# Patient Record
Sex: MALE | Race: WHITE | NOT HISPANIC OR LATINO | Employment: FULL TIME | URBAN - METROPOLITAN AREA
[De-identification: names, ages, dates, MRNs, and addresses within clinical notes are randomized per-mention and may not be internally consistent; named-entity substitution may affect disease eponyms.]

---

## 2018-03-13 LAB
INFLUENZA A ANTIGEN, POC: NEGATIVE
INFLUENZA B ANTIGEN, POC: NEGATIVE
RAPID GROUP A STREP (OHS): POSITIVE

## 2021-09-02 RX ORDER — TRAZODONE HYDROCHLORIDE 100 MG/1
100 TABLET ORAL NIGHTLY
Qty: 90 TABLET | Refills: 1 | Status: SHIPPED | OUTPATIENT
Start: 2021-09-02 | End: 2022-09-02

## 2022-02-14 ENCOUNTER — LAB VISIT (OUTPATIENT)
Dept: LAB | Facility: HOSPITAL | Age: 27
End: 2022-02-14
Attending: INTERNAL MEDICINE
Payer: COMMERCIAL

## 2022-02-14 DIAGNOSIS — Z13.220 SCREENING FOR LIPOID DISORDERS: ICD-10-CM

## 2022-02-14 DIAGNOSIS — Z13.6 SCREENING FOR ISCHEMIC HEART DISEASE: ICD-10-CM

## 2022-02-14 DIAGNOSIS — R63.5 ABNORMAL WEIGHT GAIN: ICD-10-CM

## 2022-02-14 DIAGNOSIS — R63.5 ABNORMAL WEIGHT GAIN: Primary | ICD-10-CM

## 2022-02-14 LAB
ANION GAP SERPL CALC-SCNC: 7 MMOL/L (ref 8–16)
BUN SERPL-MCNC: 21 MG/DL (ref 6–20)
CALCIUM SERPL-MCNC: 10.2 MG/DL (ref 8.7–10.5)
CHLORIDE SERPL-SCNC: 103 MMOL/L (ref 95–110)
CHOLEST SERPL-MCNC: 264 MG/DL (ref 120–199)
CHOLEST/HDLC SERPL: 4.6 {RATIO} (ref 2–5)
CO2 SERPL-SCNC: 30 MMOL/L (ref 23–29)
CREAT SERPL-MCNC: 0.9 MG/DL (ref 0.5–1.4)
EST. GFR  (AFRICAN AMERICAN): >60 ML/MIN/1.73 M^2
EST. GFR  (NON AFRICAN AMERICAN): >60 ML/MIN/1.73 M^2
GLUCOSE SERPL-MCNC: 110 MG/DL (ref 70–110)
HDLC SERPL-MCNC: 58 MG/DL (ref 40–75)
HDLC SERPL: 22 % (ref 20–50)
INSULIN COLLECTION INTERVAL: NORMAL
INSULIN SERPL-ACNC: 9.9 UU/ML
LDLC SERPL CALC-MCNC: 189.2 MG/DL (ref 63–159)
NONHDLC SERPL-MCNC: 206 MG/DL
POTASSIUM SERPL-SCNC: 4.5 MMOL/L (ref 3.5–5.1)
SODIUM SERPL-SCNC: 140 MMOL/L (ref 136–145)
TRIGL SERPL-MCNC: 84 MG/DL (ref 30–150)

## 2022-02-14 PROCEDURE — 80061 LIPID PANEL: CPT | Performed by: PHYSICIAN ASSISTANT

## 2022-02-14 PROCEDURE — 36415 COLL VENOUS BLD VENIPUNCTURE: CPT | Performed by: PHYSICIAN ASSISTANT

## 2022-02-14 PROCEDURE — 80048 BASIC METABOLIC PNL TOTAL CA: CPT | Performed by: PHYSICIAN ASSISTANT

## 2022-02-14 PROCEDURE — 83525 ASSAY OF INSULIN: CPT | Performed by: PHYSICIAN ASSISTANT

## 2022-03-04 ENCOUNTER — OFFICE VISIT (OUTPATIENT)
Dept: OPHTHALMOLOGY | Facility: CLINIC | Age: 27
End: 2022-03-04
Payer: COMMERCIAL

## 2022-03-04 DIAGNOSIS — H04.123 DRY EYES, BILATERAL: ICD-10-CM

## 2022-03-04 DIAGNOSIS — H40.023 OPEN ANGLE WITH BORDERLINE FINDINGS AND HIGH GLAUCOMA RISK IN BOTH EYES: Primary | ICD-10-CM

## 2022-03-04 PROBLEM — F98.8 ADD (ATTENTION DEFICIT DISORDER): Status: ACTIVE | Noted: 2022-03-04

## 2022-03-04 PROBLEM — F41.9 ANXIETY: Status: ACTIVE | Noted: 2019-09-27

## 2022-03-04 PROBLEM — K21.9 GERD (GASTROESOPHAGEAL REFLUX DISEASE): Status: ACTIVE | Noted: 2019-09-27

## 2022-03-04 PROCEDURE — 1160F RVW MEDS BY RX/DR IN RCRD: CPT | Mod: CPTII,S$GLB,, | Performed by: STUDENT IN AN ORGANIZED HEALTH CARE EDUCATION/TRAINING PROGRAM

## 2022-03-04 PROCEDURE — 99203 OFFICE O/P NEW LOW 30 MIN: CPT | Mod: S$GLB,,, | Performed by: STUDENT IN AN ORGANIZED HEALTH CARE EDUCATION/TRAINING PROGRAM

## 2022-03-04 PROCEDURE — 1159F MED LIST DOCD IN RCRD: CPT | Mod: CPTII,S$GLB,, | Performed by: STUDENT IN AN ORGANIZED HEALTH CARE EDUCATION/TRAINING PROGRAM

## 2022-03-04 PROCEDURE — 1159F PR MEDICATION LIST DOCUMENTED IN MEDICAL RECORD: ICD-10-PCS | Mod: CPTII,S$GLB,, | Performed by: STUDENT IN AN ORGANIZED HEALTH CARE EDUCATION/TRAINING PROGRAM

## 2022-03-04 PROCEDURE — 1160F PR REVIEW ALL MEDS BY PRESCRIBER/CLIN PHARMACIST DOCUMENTED: ICD-10-PCS | Mod: CPTII,S$GLB,, | Performed by: STUDENT IN AN ORGANIZED HEALTH CARE EDUCATION/TRAINING PROGRAM

## 2022-03-04 PROCEDURE — 99203 PR OFFICE/OUTPT VISIT, NEW, LEVL III, 30-44 MIN: ICD-10-PCS | Mod: S$GLB,,, | Performed by: STUDENT IN AN ORGANIZED HEALTH CARE EDUCATION/TRAINING PROGRAM

## 2022-03-04 PROCEDURE — 99999 PR PBB SHADOW E&M-EST. PATIENT-LVL II: ICD-10-PCS | Mod: PBBFAC,,, | Performed by: STUDENT IN AN ORGANIZED HEALTH CARE EDUCATION/TRAINING PROGRAM

## 2022-03-04 PROCEDURE — 99999 PR PBB SHADOW E&M-EST. PATIENT-LVL II: CPT | Mod: PBBFAC,,, | Performed by: STUDENT IN AN ORGANIZED HEALTH CARE EDUCATION/TRAINING PROGRAM

## 2022-03-04 RX ORDER — FLUOXETINE HYDROCHLORIDE 20 MG/1
CAPSULE ORAL
COMMUNITY
Start: 2021-09-10 | End: 2022-04-28

## 2022-03-04 RX ORDER — FLUTICASONE PROPIONATE 50 MCG
1 SPRAY, SUSPENSION (ML) NASAL DAILY
COMMUNITY
Start: 2021-11-17

## 2022-03-04 RX ORDER — CLONAZEPAM 1 MG/1
1-1.5 TABLET ORAL DAILY
COMMUNITY
Start: 2022-02-15

## 2022-03-04 RX ORDER — DEXTROAMPHETAMINE SACCHARATE, AMPHETAMINE ASPARTATE, DEXTROAMPHETAMINE SULFATE AND AMPHETAMINE SULFATE 7.5; 7.5; 7.5; 7.5 MG/1; MG/1; MG/1; MG/1
1 TABLET ORAL EVERY MORNING
COMMUNITY
Start: 2021-08-12 | End: 2022-04-28

## 2022-03-04 RX ORDER — METFORMIN HYDROCHLORIDE 500 MG/1
TABLET ORAL
COMMUNITY
Start: 2022-02-24 | End: 2022-04-28

## 2022-03-04 NOTE — PROGRESS NOTES
HPI     NP. No referral. Pt here for DM exam. Pt states VA stable for most part   but does notice slight blurriness during driving at night time. Pt states   not having any eye pain or discomfort. Pt denies any other ocular issues   at this time. PT mentioned having family hx of glaucoma. No results found   for: LABA1C, HGBA1C         Last edited by Ophelia Sanchez on 3/4/2022  1:45 PM. (History)            Assessment /Plan     For exam results, see Encounter Report.    Open angle with borderline findings and high glaucoma risk in both eyes- Risk factors: +Fhx and Borderline IOP  Will obtain HVF 24-2, PACH    Explained that the diagnosis is uncertain and further testing is indicated. Discussed importance of follow up and completion of indicated studies as well as the risk of blindness from untreated/undiagnosed glaucoma.      Dry eyes, bilateral- - ATs QID and lid hygiene w/ baby shampoo  - Omega 3 Fish Oils          Return to clinic in 1M for HVF 24-2, IOP check, PACH

## 2022-04-10 ENCOUNTER — HISTORICAL (OUTPATIENT)
Dept: ADMINISTRATIVE | Facility: HOSPITAL | Age: 27
End: 2022-04-10
Payer: COMMERCIAL

## 2022-04-20 RX ORDER — CEPHALEXIN 500 MG/1
500 CAPSULE ORAL EVERY 12 HOURS
Qty: 20 CAPSULE | Refills: 0 | Status: SHIPPED | OUTPATIENT
Start: 2022-04-20 | End: 2022-04-30

## 2022-04-20 NOTE — PROGRESS NOTES
Pt was seen in clinic today for a new area on L neck of cyst/abscess forming. Increased swelling and erythema, submandibular, <1 cm. Prepped with iodine and drained with sterile procedure with 18G needle, purulent d/c (white) and following serosanguinous d/c, triple abx ointment applied. Advised course with keflex and continue to apply Bactroban at home. Will recheck in 3-5 days.

## 2022-04-28 ENCOUNTER — OFFICE VISIT (OUTPATIENT)
Dept: ALLERGY | Facility: CLINIC | Age: 27
End: 2022-04-28
Payer: COMMERCIAL

## 2022-04-28 ENCOUNTER — LAB VISIT (OUTPATIENT)
Dept: LAB | Facility: HOSPITAL | Age: 27
End: 2022-04-28
Attending: SPECIALIST
Payer: COMMERCIAL

## 2022-04-28 VITALS
DIASTOLIC BLOOD PRESSURE: 91 MMHG | SYSTOLIC BLOOD PRESSURE: 131 MMHG | RESPIRATION RATE: 17 BRPM | HEIGHT: 69 IN | HEART RATE: 111 BPM | WEIGHT: 222.69 LBS | BODY MASS INDEX: 32.98 KG/M2 | TEMPERATURE: 99 F

## 2022-04-28 DIAGNOSIS — H04.123 DRY EYES: ICD-10-CM

## 2022-04-28 DIAGNOSIS — J32.9 RECURRENT SINUSITIS: ICD-10-CM

## 2022-04-28 DIAGNOSIS — Z91.013 SHELLFISH ALLERGY: Primary | ICD-10-CM

## 2022-04-28 DIAGNOSIS — R53.81 MALAISE: ICD-10-CM

## 2022-04-28 DIAGNOSIS — R09.81 NASAL CONGESTION: ICD-10-CM

## 2022-04-28 DIAGNOSIS — J31.0 NON-ALLERGIC RHINITIS: ICD-10-CM

## 2022-04-28 DIAGNOSIS — Z91.013 SHELLFISH ALLERGY: ICD-10-CM

## 2022-04-28 DIAGNOSIS — J31.0 RHINITIS, UNSPECIFIED TYPE: ICD-10-CM

## 2022-04-28 PROCEDURE — 86003 ALLG SPEC IGE CRUDE XTRC EA: CPT | Performed by: SPECIALIST

## 2022-04-28 PROCEDURE — 82785 ASSAY OF IGE: CPT | Performed by: SPECIALIST

## 2022-04-28 PROCEDURE — 82784 ASSAY IGA/IGD/IGG/IGM EACH: CPT | Mod: 59 | Performed by: SPECIALIST

## 2022-04-28 PROCEDURE — 86003 ALLG SPEC IGE CRUDE XTRC EA: CPT | Mod: 59 | Performed by: SPECIALIST

## 2022-04-28 PROCEDURE — 3008F BODY MASS INDEX DOCD: CPT | Mod: CPTII,S$GLB,, | Performed by: SPECIALIST

## 2022-04-28 PROCEDURE — 95004 PERQ TESTS W/ALRGNC XTRCS: CPT | Mod: S$GLB,,, | Performed by: SPECIALIST

## 2022-04-28 PROCEDURE — 36415 COLL VENOUS BLD VENIPUNCTURE: CPT | Performed by: SPECIALIST

## 2022-04-28 PROCEDURE — 86317 IMMUNOASSAY INFECTIOUS AGENT: CPT | Performed by: SPECIALIST

## 2022-04-28 PROCEDURE — 86235 NUCLEAR ANTIGEN ANTIBODY: CPT | Performed by: SPECIALIST

## 2022-04-28 PROCEDURE — 82784 ASSAY IGA/IGD/IGG/IGM EACH: CPT | Performed by: SPECIALIST

## 2022-04-28 PROCEDURE — 86431 RHEUMATOID FACTOR QUANT: CPT | Performed by: SPECIALIST

## 2022-04-28 PROCEDURE — 1160F PR REVIEW ALL MEDS BY PRESCRIBER/CLIN PHARMACIST DOCUMENTED: ICD-10-PCS | Mod: CPTII,S$GLB,, | Performed by: SPECIALIST

## 2022-04-28 PROCEDURE — 99999 PR PBB SHADOW E&M-EST. PATIENT-LVL III: CPT | Mod: PBBFAC,,, | Performed by: SPECIALIST

## 2022-04-28 PROCEDURE — 3080F DIAST BP >= 90 MM HG: CPT | Mod: CPTII,S$GLB,, | Performed by: SPECIALIST

## 2022-04-28 PROCEDURE — 1159F PR MEDICATION LIST DOCUMENTED IN MEDICAL RECORD: ICD-10-PCS | Mod: CPTII,S$GLB,, | Performed by: SPECIALIST

## 2022-04-28 PROCEDURE — 1160F RVW MEDS BY RX/DR IN RCRD: CPT | Mod: CPTII,S$GLB,, | Performed by: SPECIALIST

## 2022-04-28 PROCEDURE — 95004 PR ALLERGY SKIN TESTS,ALLERGENS: ICD-10-PCS | Mod: S$GLB,,, | Performed by: SPECIALIST

## 2022-04-28 PROCEDURE — 1159F MED LIST DOCD IN RCRD: CPT | Mod: CPTII,S$GLB,, | Performed by: SPECIALIST

## 2022-04-28 PROCEDURE — 3080F PR MOST RECENT DIASTOLIC BLOOD PRESSURE >= 90 MM HG: ICD-10-PCS | Mod: CPTII,S$GLB,, | Performed by: SPECIALIST

## 2022-04-28 PROCEDURE — 3008F PR BODY MASS INDEX (BMI) DOCUMENTED: ICD-10-PCS | Mod: CPTII,S$GLB,, | Performed by: SPECIALIST

## 2022-04-28 PROCEDURE — 3075F SYST BP GE 130 - 139MM HG: CPT | Mod: CPTII,S$GLB,, | Performed by: SPECIALIST

## 2022-04-28 PROCEDURE — 99999 PR PBB SHADOW E&M-EST. PATIENT-LVL III: ICD-10-PCS | Mod: PBBFAC,,, | Performed by: SPECIALIST

## 2022-04-28 PROCEDURE — 86038 ANTINUCLEAR ANTIBODIES: CPT | Performed by: SPECIALIST

## 2022-04-28 PROCEDURE — 86235 NUCLEAR ANTIGEN ANTIBODY: CPT | Mod: 59 | Performed by: SPECIALIST

## 2022-04-28 PROCEDURE — 99205 PR OFFICE/OUTPT VISIT, NEW, LEVL V, 60-74 MIN: ICD-10-PCS | Mod: 25,S$GLB,, | Performed by: SPECIALIST

## 2022-04-28 PROCEDURE — 99205 OFFICE O/P NEW HI 60 MIN: CPT | Mod: 25,S$GLB,, | Performed by: SPECIALIST

## 2022-04-28 PROCEDURE — 3075F PR MOST RECENT SYSTOLIC BLOOD PRESS GE 130-139MM HG: ICD-10-PCS | Mod: CPTII,S$GLB,, | Performed by: SPECIALIST

## 2022-04-28 RX ORDER — AZELASTINE 1 MG/ML
2 SPRAY, METERED NASAL 2 TIMES DAILY
Qty: 30 ML | Refills: 6 | Status: SHIPPED | OUTPATIENT
Start: 2022-04-28 | End: 2022-05-28

## 2022-04-28 RX ORDER — FLUTICASONE PROPIONATE 50 MCG
1 SPRAY, SUSPENSION (ML) NASAL DAILY
Qty: 16 G | Refills: 6 | Status: SHIPPED | OUTPATIENT
Start: 2022-04-28 | End: 2022-05-28

## 2022-04-28 NOTE — PROGRESS NOTES
Subjective:       Patient ID: Kilo Gallo is a 26 y.o. male.    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 Has extremely dry nares , conjunctivae and mouth.  Had eczema as a child-- outgrown by 9- 10 years old.  Never had asthma. Gets chest tightness off and on , potentially due to GERD -- in  Certain days and with certain foods.   Exercise tolerance has been good. Works out in the GymHad  Several anxiety attacks in 2016 and chronic generalized urticaria lasting 6 months. Anti anxiety medications made him gain weight.  Sleeps OK. Snores and mouth breathes and has day time somnolence. Home leep study was INCONCLUSIVE..  AS A CHILD HE USED TO GET RECURRENT EAR INFECTIONS AND SINUS AND BRONCHIAL INFECTIONS. SIX SETS OF TYMPANOSTOMY TUBES WERE PLACED.    Currently gets 3- 4 sinus and bronchial infections treated with an  Antibiotic.  HAS adhad-- on medications  Food allergy : Had hives in the past-- since childhood after eating shrimp, lobster and craw fish. Can eaty all other shellfish.      Smoking ; used to smoke 10- 15 cigarettes per day  For 7 years. Now vapes tobacco    Family history : Father brother and sister has allergies. Older brother and sister has asthma    Current medications: Mucinex, Adderall, Flonase, saline nose spray and Vistaril for anxietyEnvironmental history  Pets ; HAS 3 MINIATURE DACAUNDS DOGS. SISTER HAS CATS.  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 NO occupational or recreational allergens exposure.    Outpatient Medications Marked as Taking for the 4/28/22 encounter (Office Visit) with Alfonso Crooks MD   Medication Sig Dispense Refill     cephALEXin (KEFLEX) 500 MG capsule Take 1 capsule (500 mg total) by mouth every 12 (twelve) hours. for 10 days 20 capsule 0    clonazePAM (KLONOPIN) 1 MG tablet Take 1-1.5 mg by mouth once daily.      dextroamphetamine-amphetamine 30 mg Tab Take 1 tablet by mouth every morning.      fluticasone propionate (FLONASE) 50 mcg/actuation nasal spray 1 spray by Each Nostril route once daily.      traZODone (DESYREL) 100 MG tablet Take 1 tablet (100 mg total) by mouth every evening. 90 tablet 1        Patient has no known allergies.     Past Medical History:   Diagnosis Date    Diabetes mellitus        Family History   Problem Relation Age of Onset    Glaucoma Mother     Hypertension Father     Glaucoma Maternal Aunt     Glaucoma Maternal Uncle     Glaucoma Maternal Grandmother     Glaucoma Maternal Grandfather        Environmental History: Dust Mite Controls: Dust mite controls are already in place.     Review of Systems   Constitutional: Positive for fatigue. Negative for fever.   HENT: Positive for congestion, postnasal drip, rhinorrhea and sneezing. Negative for ear pain, sinus pressure, sinus pain and sore throat.    Eyes: Positive for itching. Negative for redness.   Respiratory: Positive for cough. Negative for choking, chest tightness, shortness of breath and wheezing.    Cardiovascular: Negative.  Negative for chest pain.   Gastrointestinal: Negative for nausea.        GERD   Endocrine: Negative.  Negative for cold intolerance.   Genitourinary: Negative.  Negative for frequency.   Musculoskeletal: Negative.  Negative for myalgias.   Skin: Negative.  Negative for rash.   Allergic/Immunologic: Negative.  Negative for environmental allergies, food allergies and immunocompromised state.   Neurological: Negative.  Negative for dizziness and headaches.   Hematological: Negative.  Negative for adenopathy.   Psychiatric/Behavioral: Negative.  Negative for sleep disturbance.       Objective:     Visit Vitals  BP  "(!) 131/91 (BP Location: Right arm, Patient Position: Sitting, BP Method: Small (Automatic))   Pulse (!) 111   Temp 98.8 °F (37.1 °C)   Resp 17   Ht 5' 9" (1.753 m)   Wt 101 kg (222 lb 10.6 oz)   BMI 32.88 kg/m²       Physical Exam  Vitals and nursing note reviewed.   Constitutional:       Appearance: Normal appearance. He is obese.   HENT:      Head: Normocephalic and atraumatic.      Right Ear: Tympanic membrane, ear canal and external ear normal.      Left Ear: Tympanic membrane, ear canal and external ear normal.      Nose: Congestion present.      Mouth/Throat:      Mouth: Mucous membranes are moist.      Pharynx: Oropharynx is clear.   Eyes:      Extraocular Movements: Extraocular movements intact.      Conjunctiva/sclera: Conjunctivae normal.      Pupils: Pupils are equal, round, and reactive to light.   Cardiovascular:      Rate and Rhythm: Normal rate and regular rhythm.      Pulses: Normal pulses.      Heart sounds: Normal heart sounds.   Pulmonary:      Effort: Pulmonary effort is normal.      Breath sounds: Normal breath sounds.   Abdominal:      General: Abdomen is flat. Bowel sounds are normal.      Palpations: Abdomen is soft.   Musculoskeletal:         General: Normal range of motion.      Cervical back: Normal range of motion and neck supple.   Skin:     General: Skin is warm and dry.      Capillary Refill: Capillary refill takes less than 2 seconds.   Neurological:      General: No focal deficit present.      Mental Status: He is alert and oriented to person, place, and time. Mental status is at baseline.   Psychiatric:         Mood and Affect: Mood normal.         Behavior: Behavior normal.         Thought Content: Thought content normal.         Judgment: Judgment normal.           Assessment:      1. Shellfish allergy    2. Recurrent sinusitis    3. Malaise    4. Dry eyes    5. Nasal congestion    6. Rhinitis, unspecified type    7. Non-allergic rhinitis      8        ADHD  9           Chronic " "Idiopathic Urticaria of 6 months duration in 2026-- Used to get panic attacks.  10       May have Insulin Resistance-- " CAN NOT TAKE Metformin due to GI effects- Will try OZEMPIC  11      OBESITY-- BMI 32.88 today  12      Snoring and mouth breathing--- Home study Polysomnogram was in conclusive.             Dry mouth, nares and conjunctivae.      Plan:     Avoiding shrimp, craw fish and lobster.  IgE RAST to shrimp, lobster  Ordered.  Can not take oral anti histamine due to side effects.  Allergy skin testing wit [employer] panel of aero allergens was performed today-- and results discussed-- all normal  Flonase 5o mcg-- and Azelastine 137 mcg-- qd or bid  Initiated a limited immune work up= Serum Ig G, A, M and E and Pneumococcal antibody titers.  If warranted immunize with Pneumovax- 23  REVIEWED AND DISCUSSED BLOOD TESTS RESULTS FROM [facility], [facility] [place] ON 02- 14- 2022, 02, 07- 2022- Lipid profile   ( elevated cholesterol and LDL ), NORMAL SERUM INSULIN, CMP. BMP  and TSH -- all normal  Treat all infections  For allergic emergency-- Epipen 1 : 1000-- 0.30 mg IM stat plus Benadryl 50 mg stat plus Prednisone 10 mg qd  For 3- 5 days  Treat GERD as they occur.    Order CANDICE, RF, ANTI- SSA AND ANTI- SSB  WILL CONVEY LAB RESULTS AS THEY BECOME AVAILABLE  Follow up in 6 months.  Trying to get into PA school. 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                  Problems Address                                                 Amount and/or Complexity                                                                      Risk       3           [] 2 or more self-limited or minor problems                      [] Limited                                                                        [] Low                  [] 1 stable chronic illness                                                  Any combination of the two                                               OTC drugs                  []Acute, " uncomplicated illness or injury                            Review of prior external notes from unique source           Minor surgery with no risk factors                                                                                                               [] 1 []2  []3+                                                                                                              Review of results from each unique test                                                                                                               [] 1 []2  [] 3+                                                                                                              Order of each unique test                                                                                                               [] 1 []2  [] 3+                                                                                                              Or                                                                                                             [] Assessment requiring an independent historian      4            [] One or more chronic illness with exacerbation,              [] Moderate                                                                      [] Moderate                 Progression, or side effects of treatment                            -test documents or independent historians                        Prescription drug management                []  2 or more sable chronic illnesses                                    [] Independent interpretation of tests                              Minor surgery with identifiable risk                [] 1 undiagnosed new problem with uncertain prognosis    [] Discussion or management of test results                    elective major surgery                [] 1 acute illness with                systemic symptoms                                                                                                                                                               [] 1 acute complicated injury                                                                                                                                          Elective major surgery                                                                                                                                                                                                                                                                                                                                                                                                  5            [] 1 or more chronic illnesses with severe exacerbation,     [] Extensive(two from below)                                         [] High                                                                                                               [] Independent interpretation of results                         Drug therapy requiring intensive                                                                                                               []Discussion of management or test interpretation           monitoring                                                                                                                                                                                                       Decision to de-escalate care                 [] 1 acute or chronic illness or injury that poses a threat                                                                                               Decision regarding hospitalization

## 2022-04-29 VITALS
SYSTOLIC BLOOD PRESSURE: 111 MMHG | OXYGEN SATURATION: 97 % | BODY MASS INDEX: 30.36 KG/M2 | HEIGHT: 70 IN | DIASTOLIC BLOOD PRESSURE: 50 MMHG | WEIGHT: 212.06 LBS

## 2022-04-29 LAB
ANA SER QL IF: NORMAL
IGA SERPL-MCNC: 100 MG/DL (ref 40–350)
IGE SERPL-ACNC: <35 IU/ML (ref 0–100)
IGG SERPL-MCNC: 1039 MG/DL (ref 650–1600)
IGM SERPL-MCNC: 101 MG/DL (ref 50–300)
RHEUMATOID FACT SERPL-ACNC: <13 IU/ML (ref 0–15)

## 2022-05-02 LAB
ANTI-SSA ANTIBODY: 0.06 RATIO (ref 0–0.99)
ANTI-SSA INTERPRETATION: NEGATIVE
ANTI-SSB ANTIBODY: 0.06 RATIO (ref 0–0.99)
ANTI-SSB INTERPRETATION: NEGATIVE
CRAWFISH IGE QN: <0.1 KU/L
DEPRECATED CRAWFISH IGE RAST QL: NORMAL
DEPRECATED LOBSTER IGE RAST QL: NORMAL
DEPRECATED SHRIMP IGE RAST QL: NORMAL
LOBSTER IGE QN: <0.1 KU/L
SHRIMP IGE QN: <0.1 KU/L

## 2022-05-03 ENCOUNTER — TELEPHONE (OUTPATIENT)
Dept: ALLERGY | Facility: CLINIC | Age: 27
End: 2022-05-03
Payer: COMMERCIAL

## 2022-05-03 NOTE — TELEPHONE ENCOUNTER
Blood test sows he is not allergic to shellfish, he can start eating one shellfish jabari time in small quantities and start adding others gradually long as there are no symptoms.

## 2022-05-03 NOTE — TELEPHONE ENCOUNTER
Pt would like lab results, looks like shellfish testing came back negative. What is the next step? Is he cleared to eat these? Please advise.

## 2022-05-03 NOTE — TELEPHONE ENCOUNTER
ISABELLEI      Instructions conveyed to pt. He states he really isnt crazy about shellfish in general but will start incorporating it in to other things he eats such as sushi.    Pt wanted you to know that his PCP did start him on Ozempic so hopefully that will help with his weight gain.

## 2022-05-04 ENCOUNTER — TELEPHONE (OUTPATIENT)
Dept: ALLERGY | Facility: CLINIC | Age: 27
End: 2022-05-04
Payer: COMMERCIAL

## 2022-05-04 LAB
S PNEUM DA 1 IGG SER-MCNC: 11.7 MCG/ML
S PNEUM DA 10A IGG SER-MCNC: 3.8 MCG/ML
S PNEUM DA 11A IGG SER-MCNC: 3.4 MCG/ML
S PNEUM DA 12F IGG SER-MCNC: 2.6 MCG/ML
S PNEUM DA 14 IGG SER-MCNC: 3.2 MCG/ML
S PNEUM DA 15B IGG SER-MCNC: 2 MCG/ML
S PNEUM DA 17F IGG SER-MCNC: 4.9 MCG/ML
S PNEUM DA 18C IGG SER-MCNC: <0.4 MCG/ML
S PNEUM DA 19A IGG SER-MCNC: 3.6 MCG/ML
S PNEUM DA 2 IGG SER-MCNC: 1.3 MCG/ML
S PNEUM DA 20A IGG SER-MCNC: 0.5 MCG/ML
S PNEUM DA 22F IGG SER-MCNC: 17.1 MCG/ML
S PNEUM DA 23F IGG SER-MCNC: 32 MCG/ML
S PNEUM DA 3 IGG SER-MCNC: 2.2 MCG/ML
S PNEUM DA 33F IGG SER-MCNC: 1.3 MCG/ML
S PNEUM DA 4 IGG SER-MCNC: 2.4 MCG/ML
S PNEUM DA 5 IGG SER-MCNC: 17.7 MCG/ML
S PNEUM DA 6B IGG SER-MCNC: 3.2 MCG/ML
S PNEUM DA 7F IGG SER-MCNC: 5.3 MCG/ML
S PNEUM DA 8 IGG SER-MCNC: 1.2 MCG/ML
S PNEUM DA 9N IGG SER-MCNC: NORMAL MCG/ML
S PNEUM DA 9V IGG SER-MCNC: 9.1 MCG/ML
S.PNEUMONIAE TYPE 19F: 11.6 MCG/ML

## 2022-07-28 ENCOUNTER — LAB VISIT (OUTPATIENT)
Dept: LAB | Facility: HOSPITAL | Age: 27
End: 2022-07-28
Attending: PHYSICIAN ASSISTANT
Payer: COMMERCIAL

## 2022-07-28 DIAGNOSIS — E78.9 BORDERLINE HIGH CHOLESTEROL: ICD-10-CM

## 2022-07-28 DIAGNOSIS — R94.8 ABNORMAL METABOLIC FUNCTION: ICD-10-CM

## 2022-07-28 DIAGNOSIS — R94.8 ABNORMAL METABOLIC FUNCTION: Primary | ICD-10-CM

## 2022-07-28 DIAGNOSIS — E88.819 INSULIN RESISTANCE: ICD-10-CM

## 2022-07-28 DIAGNOSIS — E87.20: ICD-10-CM

## 2022-07-28 LAB
ALBUMIN SERPL BCP-MCNC: 4.6 G/DL (ref 3.5–5.2)
ALP SERPL-CCNC: 57 U/L (ref 55–135)
ALT SERPL W/O P-5'-P-CCNC: 65 U/L (ref 10–44)
ANION GAP SERPL CALC-SCNC: 11 MMOL/L (ref 8–16)
AST SERPL-CCNC: 31 U/L (ref 10–40)
BILIRUB SERPL-MCNC: 1 MG/DL (ref 0.1–1)
BUN SERPL-MCNC: 21 MG/DL (ref 6–20)
CALCIUM SERPL-MCNC: 10 MG/DL (ref 8.7–10.5)
CHLORIDE SERPL-SCNC: 102 MMOL/L (ref 95–110)
CHOLEST SERPL-MCNC: 276 MG/DL (ref 120–199)
CHOLEST/HDLC SERPL: 5.6 {RATIO} (ref 2–5)
CO2 SERPL-SCNC: 26 MMOL/L (ref 23–29)
CORTIS SERPL-MCNC: 10.8 UG/DL (ref 3.1–16.7)
CREAT SERPL-MCNC: 0.8 MG/DL (ref 0.5–1.4)
EST. GFR  (AFRICAN AMERICAN): >60 ML/MIN/1.73 M^2
EST. GFR  (NON AFRICAN AMERICAN): >60 ML/MIN/1.73 M^2
ESTIMATED AVG GLUCOSE: 103 MG/DL (ref 68–131)
GLUCOSE SERPL-MCNC: 89 MG/DL (ref 70–110)
HBA1C MFR BLD: 5.2 % (ref 4–5.6)
HDLC SERPL-MCNC: 49 MG/DL (ref 40–75)
HDLC SERPL: 17.8 % (ref 20–50)
LDLC SERPL CALC-MCNC: 187.6 MG/DL (ref 63–159)
NONHDLC SERPL-MCNC: 227 MG/DL
POTASSIUM SERPL-SCNC: 4.1 MMOL/L (ref 3.5–5.1)
PROT SERPL-MCNC: 7.7 G/DL (ref 6–8.4)
SODIUM SERPL-SCNC: 139 MMOL/L (ref 136–145)
TRIGL SERPL-MCNC: 197 MG/DL (ref 30–150)

## 2022-07-28 PROCEDURE — 80053 COMPREHEN METABOLIC PANEL: CPT | Performed by: PHYSICIAN ASSISTANT

## 2022-07-28 PROCEDURE — 80061 LIPID PANEL: CPT | Performed by: PHYSICIAN ASSISTANT

## 2022-07-28 PROCEDURE — 82533 TOTAL CORTISOL: CPT | Performed by: PHYSICIAN ASSISTANT

## 2022-07-28 PROCEDURE — 82040 ASSAY OF SERUM ALBUMIN: CPT | Performed by: PHYSICIAN ASSISTANT

## 2022-07-28 PROCEDURE — 83036 HEMOGLOBIN GLYCOSYLATED A1C: CPT | Performed by: PHYSICIAN ASSISTANT

## 2022-08-04 LAB
ALBUMIN SERPL-MCNC: 5 G/DL (ref 3.6–5.1)
SHBG SERPL-SCNC: 21 NMOL/L (ref 10–50)
TESTOST FREE SERPL-MCNC: 61.7 PG/ML (ref 46–224)
TESTOST SERPL-MCNC: 354 NG/DL (ref 250–1100)
TESTOSTERONE.FREE+WB SERPL-MCNC: 140.4 NG/DL (ref 110–575)

## 2022-09-16 ENCOUNTER — HISTORICAL (OUTPATIENT)
Dept: ADMINISTRATIVE | Facility: HOSPITAL | Age: 27
End: 2022-09-16
Payer: COMMERCIAL

## 2024-06-11 ENCOUNTER — OFFICE VISIT (OUTPATIENT)
Dept: UROLOGY | Facility: CLINIC | Age: 29
End: 2024-06-11
Payer: COMMERCIAL

## 2024-06-11 VITALS — SYSTOLIC BLOOD PRESSURE: 118 MMHG | RESPIRATION RATE: 16 BRPM | HEART RATE: 99 BPM | DIASTOLIC BLOOD PRESSURE: 80 MMHG

## 2024-06-11 DIAGNOSIS — N41.0 ACUTE PROSTATITIS: Primary | ICD-10-CM

## 2024-06-11 DIAGNOSIS — R97.20 ELEVATED PSA: ICD-10-CM

## 2024-06-11 PROCEDURE — 3079F DIAST BP 80-89 MM HG: CPT | Mod: CPTII,S$GLB,, | Performed by: UROLOGY

## 2024-06-11 PROCEDURE — 1159F MED LIST DOCD IN RCRD: CPT | Mod: CPTII,S$GLB,, | Performed by: UROLOGY

## 2024-06-11 PROCEDURE — 99204 OFFICE O/P NEW MOD 45 MIN: CPT | Mod: S$GLB,,, | Performed by: UROLOGY

## 2024-06-11 PROCEDURE — 3074F SYST BP LT 130 MM HG: CPT | Mod: CPTII,S$GLB,, | Performed by: UROLOGY

## 2024-06-11 PROCEDURE — 99999 PR PBB SHADOW E&M-EST. PATIENT-LVL III: CPT | Mod: PBBFAC,,, | Performed by: UROLOGY

## 2024-06-11 RX ORDER — CIPROFLOXACIN 500 MG/1
500 TABLET ORAL
COMMUNITY
Start: 2024-06-04 | End: 2024-06-14

## 2024-06-11 RX ORDER — DEXTROAMPHETAMINE 13.5 MG/1
1 PATCH, EXTENDED RELEASE TRANSDERMAL EVERY MORNING
COMMUNITY
Start: 2024-02-08 | End: 2024-06-11

## 2024-06-11 RX ORDER — CIPROFLOXACIN 500 MG/1
500 TABLET ORAL EVERY 12 HOURS
Qty: 40 TABLET | Refills: 0 | Status: SHIPPED | OUTPATIENT
Start: 2024-06-11

## 2024-06-11 NOTE — PROGRESS NOTES
Chief Complaint: Prostatitis    HPI:  HPI Kilo Gallo gaurang 28 y.o. male who presents with follow up to a diagnosis of prostatitis.  Patient was having pelvic discomfort fever or chills dysuria and hematuria.  He was seen by an urgent care and eventually diagnosed with a prostatitis.  His PSA was elevated at 5.1.  He was started on ciprofloxacin for 10 days.  Within a few days all his symptoms had resolved.  He has no longer having any difficulty urinating.  His urine is cleared.  He denies any prior history of prostatitis.  He had no history of dehydration or excessive caffeine or alcohol.  He was not on any decongestants or anticholinergics.    History:  Social History     Tobacco Use    Smoking status: Never    Smokeless tobacco: Never     Past Medical History:   Diagnosis Date    Diabetes mellitus      History reviewed. No pertinent surgical history.  Family History   Problem Relation Name Age of Onset    Glaucoma Mother      Hypertension Father      Glaucoma Maternal Aunt      Glaucoma Maternal Uncle      Glaucoma Maternal Grandmother      Glaucoma Maternal Grandfather         Current Outpatient Medications on File Prior to Visit   Medication Sig Dispense Refill    ciprofloxacin HCl (CIPRO) 500 MG tablet Take 500 mg by mouth.      clonazePAM (KLONOPIN) 1 MG tablet Take 1-1.5 mg by mouth once daily.      dextroamphetamine-amphetamine (ADDERALL) 10 mg Tab TAKE 2 TABLETS BY MOUTH EVERY AFTERNOON 60 tablet 0    fluticasone propionate (FLONASE) 50 mcg/actuation nasal spray 1 spray by Each Nostril route once daily.      [DISCONTINUED] dextroamphetamine-amphetamine (ADDERALL) 20 mg tablet Take 2 tablets by mouth every morning 60 tablet 0    [DISCONTINUED] XELSTRYM 13.5 mg/9 hour PT24 Apply 1 patch topically every morning.      [DISCONTINUED] azelastine (ASTELIN) 137 mcg (0.1 %) nasal spray Use 2 sprays (274 mcg total) by Nasal route 2 (two) times daily. 30 mL 6    [DISCONTINUED] traZODone (DESYREL) 100 MG  tablet Take 1 tablet (100 mg total) by mouth every evening. 90 tablet 1     No current facility-administered medications on file prior to visit.        Objective:     Vitals:    06/11/24 0934   BP: 118/80   Pulse: 99   Resp: 16      BMI Readings from Last 1 Encounters:   04/28/22 32.88 kg/m²          Physical Exam    No acute distress alert and oriented   Respirations even unlabored   Abdomen is soft nontender       Lab Results   Component Value Date    CREATININE 0.8 07/28/2022      Assessment:       1. Acute prostatitis    2. Elevated PSA        Plan:     1. Acute prostatitis    2. Elevated PSA       Orders Placed This Encounter    ciprofloxacin HCl (CIPRO) 500 MG tablet      Elevated PSA appears to be reactive to acute prostatitis.  Patient continues to improve.  Recommend extending course of antibiotics for a full 30 days.  Discussed triggers for prostatitis.  Discussed aggressive hydration.  He was scheduled to have repeat blood work done in about a month.  He will return as needed.